# Patient Record
Sex: FEMALE | Race: WHITE | NOT HISPANIC OR LATINO | Employment: FULL TIME | URBAN - METROPOLITAN AREA
[De-identification: names, ages, dates, MRNs, and addresses within clinical notes are randomized per-mention and may not be internally consistent; named-entity substitution may affect disease eponyms.]

---

## 2017-05-11 ENCOUNTER — HOSPITAL ENCOUNTER (OUTPATIENT)
Dept: MAMMOGRAPHY | Facility: CLINIC | Age: 42
Discharge: HOME | End: 2017-05-11
Attending: FAMILY MEDICINE | Admitting: FAMILY MEDICINE
Payer: COMMERCIAL

## 2017-05-11 DIAGNOSIS — Z12.31 VISIT FOR SCREENING MAMMOGRAM: ICD-10-CM

## 2017-05-11 PROCEDURE — G0202 SCR MAMMO BI INCL CAD: HCPCS

## 2017-05-11 PROCEDURE — 77063 BREAST TOMOSYNTHESIS BI: CPT

## 2019-04-08 ENCOUNTER — HOSPITAL ENCOUNTER (OUTPATIENT)
Dept: MAMMOGRAPHY | Facility: CLINIC | Age: 44
Discharge: HOME | End: 2019-04-08
Attending: FAMILY MEDICINE | Admitting: FAMILY MEDICINE
Payer: COMMERCIAL

## 2019-04-08 DIAGNOSIS — Z12.31 VISIT FOR SCREENING MAMMOGRAM: ICD-10-CM

## 2019-04-08 PROCEDURE — 77063 BREAST TOMOSYNTHESIS BI: CPT

## 2021-11-30 DIAGNOSIS — Z52.001 STEM CELL DONOR: Primary | ICD-10-CM

## 2021-12-05 PROCEDURE — 81382 HLA II TYPING 1 LOC HR: CPT | Mod: XU | Performed by: INTERNAL MEDICINE

## 2021-12-05 PROCEDURE — 81378 HLA I & II TYPING HR: CPT

## 2021-12-09 DIAGNOSIS — Z52.001 STEM CELL DONOR: ICD-10-CM

## 2021-12-15 LAB
A*: NORMAL
A*LOCUS SEROLOGIC EQUIVALENT: 3
A*LOCUS: NORMAL
A*SEROLOGIC EQUIVALENT: 26
ABTEST METHOD: NORMAL
B*: NORMAL
B*LOCUS SEROLOGIC EQUIVALENT: 27
B*LOCUS: NORMAL
B*SEROLOGIC EQUIVALENT: 61
BW-1: NORMAL
BW-2: NORMAL
C*LOCUS SEROLOGIC EQUIVALENT: 2
C*LOCUS: NORMAL
DPA1*: NORMAL
DPB1*: NORMAL
DQA1*: NORMAL
DQA1*LOCUS: NORMAL
DQB1*: NORMAL
DQB1*LOCUS SEROLOGIC EQUIVALENT: 7
DQB1*LOCUS: NORMAL
DQB1*SEROLOGIC EQUIVALENT: 5
DRB1*: NORMAL
DRB1*LOCUS SEROLOGIC EQUIVALENT: 1
DRB1*LOCUS: NORMAL
DRB1*SEROLOGIC EQUIVALENT: 11
DRB3*LOCUS SEROLOGIC EQUIVALENT: 52
DRB3*LOCUS: NORMAL
DRSSO TEST METHOD: NORMAL

## 2022-04-18 ENCOUNTER — HOSPITAL ENCOUNTER (OUTPATIENT)
Dept: MAMMOGRAPHY | Facility: CLINIC | Age: 47
Discharge: HOME | End: 2022-04-18
Attending: FAMILY MEDICINE | Admitting: FAMILY MEDICINE
Payer: COMMERCIAL

## 2022-04-18 DIAGNOSIS — Z12.31 VISIT FOR SCREENING MAMMOGRAM: ICD-10-CM

## 2022-04-18 PROCEDURE — 77067 SCR MAMMO BI INCL CAD: CPT

## 2022-04-20 ENCOUNTER — HOSPITAL ENCOUNTER (OUTPATIENT)
Dept: MAMMOGRAPHY | Facility: CLINIC | Age: 47
Discharge: HOME | End: 2022-04-20
Attending: FAMILY MEDICINE | Admitting: FAMILY MEDICINE
Payer: COMMERCIAL

## 2022-04-20 DIAGNOSIS — R92.8 ABNORMAL MAMMOGRAM: ICD-10-CM

## 2022-04-20 PROCEDURE — 76642 ULTRASOUND BREAST LIMITED: CPT | Mod: LT

## 2023-04-19 ENCOUNTER — HOSPITAL ENCOUNTER (OUTPATIENT)
Dept: MAMMOGRAPHY | Facility: CLINIC | Age: 48
Discharge: HOME | End: 2023-04-19
Attending: FAMILY MEDICINE | Admitting: FAMILY MEDICINE
Payer: COMMERCIAL

## 2023-04-19 DIAGNOSIS — Z12.31 VISIT FOR SCREENING MAMMOGRAM: ICD-10-CM

## 2023-04-19 PROCEDURE — 77067 SCR MAMMO BI INCL CAD: CPT

## 2023-09-23 ENCOUNTER — OFFICE VISIT (OUTPATIENT)
Dept: URGENT CARE | Facility: URGENT CARE | Age: 48
End: 2023-09-23
Payer: COMMERCIAL

## 2023-09-23 VITALS
SYSTOLIC BLOOD PRESSURE: 153 MMHG | OXYGEN SATURATION: 100 % | DIASTOLIC BLOOD PRESSURE: 90 MMHG | TEMPERATURE: 97.9 F | RESPIRATION RATE: 20 BRPM | HEART RATE: 65 BPM | WEIGHT: 224 LBS

## 2023-09-23 DIAGNOSIS — S61.225A LACERATION OF LEFT RING FINGER WITH FOREIGN BODY WITHOUT DAMAGE TO NAIL, INITIAL ENCOUNTER: Primary | ICD-10-CM

## 2023-09-23 PROCEDURE — 99207 PR NO CHARGE LOS: CPT | Performed by: PHYSICIAN ASSISTANT

## 2023-09-23 PROCEDURE — 12002 RPR S/N/AX/GEN/TRNK2.6-7.5CM: CPT | Mod: F3 | Performed by: PHYSICIAN ASSISTANT

## 2023-09-23 RX ORDER — ONDANSETRON 4 MG/1
TABLET, ORALLY DISINTEGRATING ORAL
COMMUNITY
Start: 2023-04-11

## 2023-09-23 RX ORDER — VENLAFAXINE HYDROCHLORIDE 150 MG/1
150 CAPSULE, EXTENDED RELEASE ORAL DAILY
COMMUNITY
Start: 2023-06-23

## 2023-09-23 ASSESSMENT — ENCOUNTER SYMPTOMS
NAUSEA: 0
JOINT SWELLING: 0
LIGHT-HEADEDNESS: 0
ARTHRALGIAS: 0
WEAKNESS: 0
DIARRHEA: 0
VOMITING: 0
DIZZINESS: 0
ALLERGIC/IMMUNOLOGIC NEGATIVE: 1
WOUND: 1
SORE THROAT: 0
COUGH: 0
RHINORRHEA: 0
PALPITATIONS: 0
BACK PAIN: 0
SHORTNESS OF BREATH: 0
EYES NEGATIVE: 1
CARDIOVASCULAR NEGATIVE: 1
CHILLS: 0
HEADACHES: 0
NECK PAIN: 0
ENDOCRINE NEGATIVE: 1
NECK STIFFNESS: 0
FEVER: 0
RESPIRATORY NEGATIVE: 1
MYALGIAS: 0
MUSCULOSKELETAL NEGATIVE: 1

## 2023-09-23 NOTE — PROGRESS NOTES
Chief Complaint:     Chief Complaint   Patient presents with    Urgent Care     Urgent care visit for finger lacerations on left hand.    Laceration     Laceration of fingers on left hand. The patient cut her pointer, middle and index fingers with hedge clippers around 12:30m this afternoon while working at home in her yard.         Assessment:     1. Laceration of left ring finger with foreign body without damage to nail, initial encounter         Plan:    Imaging of the injured area for foreign body or fracture was not  Indicated    Wound closed per procedure note below.    Wounds on the middle and pointer finger were more superficial and did not require closure.      Wound was irrigated with water before repair.  Antibiotic ointment and sterile dressing applied in clinic.     Discussed home wound care. Return to  with increased swelling, pain, redness, pus or fevers.  Follow up for Suture removal in 7 days.       Patient was discharged in stable condition.  Patient verbalized understanding and agreed with this plan.      Procedure Note - Wound Repair:  Procedure performed by Lauri Valente.     Anesthesia was obtained with 1% plain lidocaine via digital block.  The wound, located on the L ring finger, measured 4 cm and was ragged edges, contaminated.  The level of complexity was: intermediate (layered closure or heavily contaminated) .  The neurovascular exam was normal.  It was irrigated with tap water and explored.  The wound was closed using 5-0 Ethylon interrupted.       SUBJECTIVE     HPI: Hilary Hay is an 48 year old female that presents to clinic after cutting self on the L hand with a hedge clippers. She has lacerations on the L ring, middle, and pointer fingers.  She denies numbness of the hand. She denies dysfunction of the hand. Patient denies any loss of strength to the hand.  Patient is up to date on tetanus.    Patient is new to [facility].     Review of Systems:  Review of  Systems   Constitutional:  Negative for chills and fever.   HENT:  Negative for congestion, ear pain, rhinorrhea and sore throat.    Eyes: Negative.    Respiratory: Negative.  Negative for cough and shortness of breath.    Cardiovascular: Negative.  Negative for chest pain and palpitations.   Gastrointestinal:  Negative for diarrhea, nausea and vomiting.   Endocrine: Negative.    Genitourinary: Negative.    Musculoskeletal: Negative.  Negative for arthralgias, back pain, joint swelling, myalgias, neck pain and neck stiffness.   Skin:  Positive for wound. Negative for rash.   Allergic/Immunologic: Negative.  Negative for immunocompromised state.   Neurological:  Negative for dizziness, weakness, light-headedness and headaches.         Family History   No family history on file.     Problem history  There is no problem list on file for this patient.       Allergies  Allergies   Allergen Reactions    Sulfa Antibiotics      PN: : Unknown Reaction        Social History  Social History     Socioeconomic History    Marital status:      Spouse name: Not on file    Number of children: Not on file    Years of education: Not on file    Highest education level: Not on file   Occupational History    Not on file   Tobacco Use    Smoking status: Former     Types: Cigarettes     Passive exposure: Never    Smokeless tobacco: Never    Tobacco comments:     Smoked cigarettes for 12 years and quit in 2008.   Substance and Sexual Activity    Alcohol use: Not on file    Drug use: Not on file    Sexual activity: Not on file   Other Topics Concern    Not on file   Social History Narrative    Not on file     Social Determinants of Health     Financial Resource Strain: Not on file   Food Insecurity: Not on file   Transportation Needs: Not on file   Physical Activity: Not on file   Stress: Not on file   Social Connections: Not on file   Interpersonal Safety: Not on file   Housing Stability: Not on file        Current Meds    Current  Outpatient Medications:     Loratadine (ALAVERT PO), , Disp: , Rfl:     PRILOSEC OR, None Entered, Disp: , Rfl:     venlafaxine (EFFEXOR XR) 150 MG 24 hr capsule, Take 150 mg by mouth daily, Disp: , Rfl:     IRON OR, None Entered (Patient not taking: Reported on 9/23/2023), Disp: , Rfl:     ondansetron (ZOFRAN ODT) 4 MG ODT tab, , Disp: , Rfl:     VITAMIN B-12 OR, None Entered (Patient not taking: Reported on 9/23/2023), Disp: , Rfl:      OBJECTIVE    Vitals reviewed by Parviz Valente PA-C  BP (!) 153/90 (BP Location: Right arm, Patient Position: Sitting, Cuff Size: Adult Large)   Pulse 65   Temp 97.9  F (36.6  C) (Oral)   Resp 20   Wt 101.6 kg (224 lb)   LMP 09/18/2023   SpO2 100%   Breastfeeding No     Physical Exam:    Physical Exam  Vitals and nursing note reviewed.   Constitutional:       General: She is not in acute distress.     Appearance: She is well-developed. She is not ill-appearing, toxic-appearing or diaphoretic.   HENT:      Head: Normocephalic and atraumatic.      Right Ear: Tympanic membrane and external ear normal. No drainage, swelling or tenderness. Tympanic membrane is not perforated, erythematous, retracted or bulging.      Left Ear: Tympanic membrane and external ear normal. No drainage, swelling or tenderness. Tympanic membrane is not perforated, erythematous, retracted or bulging.      Nose: No mucosal edema, congestion or rhinorrhea.      Right Sinus: No maxillary sinus tenderness or frontal sinus tenderness.      Left Sinus: No maxillary sinus tenderness or frontal sinus tenderness.      Mouth/Throat:      Pharynx: No pharyngeal swelling, oropharyngeal exudate, posterior oropharyngeal erythema or uvula swelling.      Tonsils: No tonsillar abscesses.   Eyes:      Pupils: Pupils are equal, round, and reactive to light.   Neck:      Trachea: Trachea normal.   Cardiovascular:      Rate and Rhythm: Normal rate and regular rhythm.      Heart sounds: Normal heart sounds, S1 normal and S2  normal. No murmur heard.     No friction rub. No gallop.   Pulmonary:      Effort: Pulmonary effort is normal. No respiratory distress.      Breath sounds: Normal breath sounds. No decreased breath sounds, wheezing, rhonchi or rales.   Abdominal:      General: Bowel sounds are normal. There is no distension.      Palpations: Abdomen is soft. Abdomen is not rigid. There is no mass.      Tenderness: There is no abdominal tenderness. There is no guarding or rebound.   Musculoskeletal:      Cervical back: Full passive range of motion without pain, normal range of motion and neck supple.      Comments: Laceration of the L ring finger subcutaneus in nature with ragged edges and contamination.     Lymphadenopathy:      Cervical: No cervical adenopathy.   Skin:     General: Skin is warm and dry.   Neurological:      Mental Status: She is alert and oriented to person, place, and time.      Cranial Nerves: No cranial nerve deficit.      Deep Tendon Reflexes: Reflexes are normal and symmetric.   Psychiatric:         Behavior: Behavior normal. Behavior is cooperative.         Thought Content: Thought content normal.         Judgment: Judgment normal.         Parviz Valente PA-C 1:58 PM

## 2023-09-30 ENCOUNTER — OFFICE VISIT (OUTPATIENT)
Dept: URGENT CARE | Facility: URGENT CARE | Age: 48
End: 2023-09-30
Payer: COMMERCIAL

## 2023-09-30 VITALS — HEART RATE: 67 BPM | TEMPERATURE: 98.9 F | OXYGEN SATURATION: 97 % | WEIGHT: 224 LBS

## 2023-09-30 DIAGNOSIS — Z48.02 ENCOUNTER FOR REMOVAL OF SUTURES: Primary | ICD-10-CM

## 2023-09-30 PROCEDURE — 99207 PR NO CHARGE LOS: CPT | Performed by: EMERGENCY MEDICINE

## 2023-09-30 NOTE — PROGRESS NOTES
ICD-10-CM    1. Encounter for removal of sutures  Z48.02           Hilary Hay presents to the clinic for removal of sutures. The patient has had sutures in place for 7 days. There has been no patient reported signs or symptoms of infection or drainage. 8 sutures intact and visualized. Tetanus status is up to date. All sutures were easily removed today. Routine wound care discussed by the provider. The patient will follow up as needed.    Jae Bowles PA-C  9/30/2023  [facility]

## 2024-04-16 ENCOUNTER — HOSPITAL ENCOUNTER (EMERGENCY)
Facility: CLINIC | Age: 49
Discharge: HOME | End: 2024-04-17
Attending: EMERGENCY MEDICINE | Admitting: EMERGENCY MEDICINE
Payer: COMMERCIAL

## 2024-04-16 DIAGNOSIS — R07.9 LEFT-SIDED CHEST PAIN: ICD-10-CM

## 2024-04-16 PROCEDURE — 99285 EMERGENCY DEPT VISIT HI MDM: CPT | Mod: 25

## 2024-04-16 PROCEDURE — 93005 ELECTROCARDIOGRAM TRACING: CPT

## 2024-04-17 ENCOUNTER — APPOINTMENT (OUTPATIENT)
Dept: GENERAL RADIOLOGY | Facility: CLINIC | Age: 49
End: 2024-04-17
Attending: EMERGENCY MEDICINE
Payer: COMMERCIAL

## 2024-04-17 VITALS
OXYGEN SATURATION: 98 % | HEART RATE: 90 BPM | SYSTOLIC BLOOD PRESSURE: 148 MMHG | DIASTOLIC BLOOD PRESSURE: 100 MMHG | RESPIRATION RATE: 14 BRPM | WEIGHT: 250 LBS | TEMPERATURE: 99.3 F | HEIGHT: 61 IN | BODY MASS INDEX: 47.2 KG/M2

## 2024-04-17 LAB
ALBUMIN UR-MCNC: NEGATIVE MG/DL
ANION GAP SERPL CALCULATED.3IONS-SCNC: 15 MMOL/L (ref 7–15)
APPEARANCE UR: CLEAR
ATRIAL RATE - MUSE: 94 BPM
BASOPHILS # BLD AUTO: 0 10E3/UL (ref 0–0.2)
BASOPHILS NFR BLD AUTO: 0 %
BILIRUB UR QL STRIP: NEGATIVE
BUN SERPL-MCNC: 13.8 MG/DL (ref 6–20)
CALCIUM SERPL-MCNC: 9.3 MG/DL (ref 8.6–10)
CHLORIDE SERPL-SCNC: 103 MMOL/L (ref 98–107)
COLOR UR AUTO: ABNORMAL
CREAT SERPL-MCNC: 0.66 MG/DL (ref 0.51–0.95)
D DIMER PPP FEU-MCNC: <0.27 UG/ML FEU (ref 0–0.5)
DEPRECATED HCO3 PLAS-SCNC: 21 MMOL/L (ref 22–29)
DIASTOLIC BLOOD PRESSURE - MUSE: NORMAL MMHG
EGFRCR SERPLBLD CKD-EPI 2021: >90 ML/MIN/1.73M2
EOSINOPHIL # BLD AUTO: 0.1 10E3/UL (ref 0–0.7)
EOSINOPHIL NFR BLD AUTO: 0 %
ERYTHROCYTE [DISTWIDTH] IN BLOOD BY AUTOMATED COUNT: 11.9 % (ref 10–15)
GLUCOSE SERPL-MCNC: 115 MG/DL (ref 70–99)
GLUCOSE UR STRIP-MCNC: NEGATIVE MG/DL
HCG INTACT+B SERPL-ACNC: <1 MIU/ML
HCT VFR BLD AUTO: 38.9 % (ref 35–47)
HGB BLD-MCNC: 12.7 G/DL (ref 11.7–15.7)
HGB UR QL STRIP: ABNORMAL
HOLD SPECIMEN: NORMAL
IMM GRANULOCYTES # BLD: 0 10E3/UL
IMM GRANULOCYTES NFR BLD: 0 %
INTERPRETATION ECG - MUSE: NORMAL
KETONES UR STRIP-MCNC: NEGATIVE MG/DL
LEUKOCYTE ESTERASE UR QL STRIP: NEGATIVE
LYMPHOCYTES # BLD AUTO: 2.1 10E3/UL (ref 0.8–5.3)
LYMPHOCYTES NFR BLD AUTO: 15 %
MCH RBC QN AUTO: 29 PG (ref 26.5–33)
MCHC RBC AUTO-ENTMCNC: 32.6 G/DL (ref 31.5–36.5)
MCV RBC AUTO: 89 FL (ref 78–100)
MONOCYTES # BLD AUTO: 1.1 10E3/UL (ref 0–1.3)
MONOCYTES NFR BLD AUTO: 8 %
MUCOUS THREADS #/AREA URNS LPF: PRESENT /LPF
NEUTROPHILS # BLD AUTO: 11.1 10E3/UL (ref 1.6–8.3)
NEUTROPHILS NFR BLD AUTO: 77 %
NITRATE UR QL: NEGATIVE
NRBC # BLD AUTO: 0 10E3/UL
NRBC BLD AUTO-RTO: 0 /100
P AXIS - MUSE: -21 DEGREES
PH UR STRIP: 6.5 [PH] (ref 5–7)
PLATELET # BLD AUTO: 406 10E3/UL (ref 150–450)
POTASSIUM SERPL-SCNC: 4.2 MMOL/L (ref 3.4–5.3)
PR INTERVAL - MUSE: 152 MS
QRS DURATION - MUSE: 86 MS
QT - MUSE: 336 MS
QTC - MUSE: 420 MS
R AXIS - MUSE: 31 DEGREES
RBC # BLD AUTO: 4.38 10E6/UL (ref 3.8–5.2)
RBC URINE: 6 /HPF
SODIUM SERPL-SCNC: 139 MMOL/L (ref 135–145)
SP GR UR STRIP: 1.02 (ref 1–1.03)
SQUAMOUS EPITHELIAL: 1 /HPF
SYSTOLIC BLOOD PRESSURE - MUSE: NORMAL MMHG
T AXIS - MUSE: 36 DEGREES
TROPONIN T SERPL HS-MCNC: <6 NG/L
UROBILINOGEN UR STRIP-MCNC: NORMAL MG/DL
VENTRICULAR RATE- MUSE: 94 BPM
WBC # BLD AUTO: 14.4 10E3/UL (ref 4–11)
WBC URINE: <1 /HPF

## 2024-04-17 PROCEDURE — 81001 URINALYSIS AUTO W/SCOPE: CPT | Performed by: EMERGENCY MEDICINE

## 2024-04-17 PROCEDURE — 84702 CHORIONIC GONADOTROPIN TEST: CPT | Performed by: EMERGENCY MEDICINE

## 2024-04-17 PROCEDURE — 84484 ASSAY OF TROPONIN QUANT: CPT | Performed by: EMERGENCY MEDICINE

## 2024-04-17 PROCEDURE — 80048 BASIC METABOLIC PNL TOTAL CA: CPT | Performed by: EMERGENCY MEDICINE

## 2024-04-17 PROCEDURE — 85379 FIBRIN DEGRADATION QUANT: CPT | Performed by: EMERGENCY MEDICINE

## 2024-04-17 PROCEDURE — 36415 COLL VENOUS BLD VENIPUNCTURE: CPT | Performed by: EMERGENCY MEDICINE

## 2024-04-17 PROCEDURE — 85025 COMPLETE CBC W/AUTO DIFF WBC: CPT | Performed by: EMERGENCY MEDICINE

## 2024-04-17 PROCEDURE — 71046 X-RAY EXAM CHEST 2 VIEWS: CPT

## 2024-04-17 ASSESSMENT — ACTIVITIES OF DAILY LIVING (ADL)
ADLS_ACUITY_SCORE: 35

## 2024-04-17 ASSESSMENT — COLUMBIA-SUICIDE SEVERITY RATING SCALE - C-SSRS
2. HAVE YOU ACTUALLY HAD ANY THOUGHTS OF KILLING YOURSELF IN THE PAST MONTH?: NO
6. HAVE YOU EVER DONE ANYTHING, STARTED TO DO ANYTHING, OR PREPARED TO DO ANYTHING TO END YOUR LIFE?: NO
1. IN THE PAST MONTH, HAVE YOU WISHED YOU WERE DEAD OR WISHED YOU COULD GO TO SLEEP AND NOT WAKE UP?: NO

## 2024-04-17 NOTE — ED TRIAGE NOTES
Pt c/o L sided rib pain and L shoulder pain that is sharp. Pain began last night. Pt reports it is painful to take a breathe in and worsening pain when she tried to lay down. Pt traveled by car for 24 hours on Friday and then took a plane ride home. Pt denies trauma.      Triage Assessment (Adult)       Row Name 04/17/24 0001          Triage Assessment    Airway WDL WDL        Respiratory WDL    Respiratory WDL WDL        Cardiac WDL    Cardiac WDL --  Pt hypertensive        Cognitive/Neuro/Behavioral WDL    Cognitive/Neuro/Behavioral WDL WDL

## 2024-04-17 NOTE — ED PROVIDER NOTES
"  History     Chief Complaint:  Rib Pain and Flank Pain     HPI   Hilary Hay is a 48 year old female who comes for left sided rib and left sided shoulder pain that is sharp.  She reports that he had symptoms on the previous day and thought that she had gas pains.  They resolved.  She felt well all day but then tonight when she went to lay down she developed recurrent pain on the left side that was quite bothersome.  She became concerned because they did drive 26 hours on Friday to [place] and then took a plane ride home.  She does not remember any trauma but she does note that she sat in the plane scrunched up.  No rashes.  Her  was concerned because she seemed to be very warm but she did not have a fever when she got here.  She noticed chattering chills.  She has not had any coughing.  No history of kidney stones.  It does hurt to take a deep breath.  She has an IUD but is not on any hormonal medications. No history of cancer, DVT/PE, hemoptysis, smoking, hormone use, lower extremity symptoms.    Independent Historian:    None - Patient Only    Review of External Notes:  None    Allergies:  Sulfa Antibiotics     Medications:    IRON OR  Loratadine (ALAVERT PO)  ondansetron (ZOFRAN ODT) 4 MG ODT tab  PRILOSEC OR  venlafaxine (EFFEXOR XR) 150 MG 24 hr capsule  VITAMIN B-12 OR        Past Medical History:    No past medical history on file.    Past Surgical History:    No past surgical history on file.     Physical Exam   Patient Vitals for the past 24 hrs:   BP Temp Temp src Pulse Resp SpO2 Height Weight   04/17/24 0321 -- -- -- -- -- 98 % -- --   04/17/24 0320 (!) 148/100 -- -- 90 -- -- -- --   04/17/24 0136 (!) 144/86 99.3  F (37.4  C) Oral 89 14 98 % -- --   04/17/24 0135 -- -- -- -- 14 98 % -- --   04/17/24 0134 (!) 144/86 -- -- 89 -- -- -- --   04/16/24 2358 (!) 180/102 -- -- -- -- -- -- --   04/16/24 2357 -- 98.6  F (37  C) Temporal 84 20 100 % 1.549 m (5' 1\") 113.4 kg (250 lb)    "     Physical Exam  General: Well-nourished  Eyes: PERRL, conjunctivae pink no scleral icterus or conjunctival injection  ENT:  Moist mucus membranes, posterior oropharynx clear without erythema or exudates  Respiratory:  Lungs clear to auscultation bilaterally, no crackles/rubs/wheezes.  Good air movement  CV: Normal rate and rhythm, no murmurs/rubs/gallops  GI:  Abdomen soft and protuberant.  Normoactive BS.  No tenderness, guarding or rebound  Skin: Warm, dry.  No rashes or petechiae.  No zoster rash.  No cellulitis.  Musculoskeletal: No midline spinal tenderness.  No peripheral edema or calf tenderness  Neuro: Alert and oriented to person/place/time  Psychiatric: Normal affect    Emergency Department Course   ECG  ECG results from 04/16/24   EKG 12 lead     Value    Systolic Blood Pressure     Diastolic Blood Pressure     Ventricular Rate 94    Atrial Rate 94    NC Interval 152    QRS Duration 86        QTc 420    P Axis -21    R AXIS 31    T Axis 36    Interpretation ECG      Sinus rhythm  Normal ECG  No previous ECGs available  Confirmed by GENERATED REPORT, COMPUTER ([id number]),  Aasen, Bradley ([id number]) on 4/17/2024 1:18:48 AM           Laboratory: Imaging:   Labs Ordered and Resulted from Time of ED Arrival to Time of ED Departure   BASIC METABOLIC PANEL - Abnormal       Result Value    Sodium 139      Potassium 4.2      Chloride 103      Carbon Dioxide (CO2) 21 (*)     Anion Gap 15      Urea Nitrogen 13.8      Creatinine 0.66      GFR Estimate >90      Calcium 9.3      Glucose 115 (*)    CBC WITH PLATELETS AND DIFFERENTIAL - Abnormal    WBC Count 14.4 (*)     RBC Count 4.38      Hemoglobin 12.7      Hematocrit 38.9      MCV 89      MCH 29.0      MCHC 32.6      RDW 11.9      Platelet Count 406      % Neutrophils 77      % Lymphocytes 15      % Monocytes 8      % Eosinophils 0      % Basophils 0      % Immature Granulocytes 0      NRBCs per 100 WBC 0      Absolute Neutrophils 11.1 (*)     Absolute  Lymphocytes 2.1      Absolute Monocytes 1.1      Absolute Eosinophils 0.1      Absolute Basophils 0.0      Absolute Immature Granulocytes 0.0      Absolute NRBCs 0.0     ROUTINE UA WITH MICROSCOPIC REFLEX TO CULTURE - Abnormal    Color Urine Light Yellow      Appearance Urine Clear      Glucose Urine Negative      Bilirubin Urine Negative      Ketones Urine Negative      Specific Gravity Urine 1.024      Blood Urine Small (*)     pH Urine 6.5      Protein Albumin Urine Negative      Urobilinogen Urine Normal      Nitrite Urine Negative      Leukocyte Esterase Urine Negative      Mucus Urine Present (*)     RBC Urine 6 (*)     WBC Urine <1      Squamous Epithelials Urine 1     TROPONIN T, HIGH SENSITIVITY - Normal    Troponin T, High Sensitivity <6     HCG QUANTITATIVE PREGNANCY - Normal    hCG Quantitative <1     D DIMER QUANTITATIVE - Normal    D-Dimer Quantitative <0.27       Chest XR,  PA & LAT   Final Result   IMPRESSION: Lungs are clear. No pleural effusion or pneumothorax. Normal heart size and pulmonary vascularity.              Emergency Department Course & Assessments:    Interventions:  Medications - No data to display     Independent Interpretation  I reviewed and interpreted the chest x-ray.  I see no evidence of pneumothorax or pneumonia.    Social Determinants of Health affecting care:  None    Disposition:  The patient was discharged to home.     Impression & Plan      Medical Decision Making:  Hilary Hay is a 48 year old female who presents with left sided chest pain and left shoulder pain.  The work up in the Emergency Department is negative.  The differential diagnosis of chest pain is broad and includes life threatening etiologies such as acute coronary syndrome, myocardial infarction, pulmonary embolism, acute aortic dissection, amongst others.  The patient's EKG was nonischemic and troponin was normal.  The chest pain symptom complex would be atypical for coronary ischemia given  that it is not exertional.  The patient is low risk for PE and has a negative D-dimer and so I feel the risks of CT imaging outweighs any benefits.  Chest xray reveals no evidence of pneumonia or pneumothorax.  No serious etiology for the chest pain were detected today during this visit.  Close follow up with primary care is indicated should the pain continue, as further work up may be performed; this was made clear to the patient, who understands.     Diagnosis:    ICD-10-CM    1. Left-sided chest pain  R07.9            Discharge Medications:  Discharge Medication List as of 4/17/2024  3:16 AM             4/17/2024   Yara Nielson MD Cho, Amy C, MD  04/17/24 0519

## 2024-04-17 NOTE — DISCHARGE INSTRUCTIONS
*You may resume diet and activities as tolerated.  *Ibuprofen and/or tylenol as directed as needed for pain.  *Follow-up with your doctor for a recheck in 2-3 days.    *Return if you develop difficulty in breathing, faint or feel like you will faint or become worse in any way.    Discharge Instructions  Chest Pain    You have been seen today for chest pain or discomfort.  At this time, your provider has found no signs that your chest pain is due to a serious or life-threatening condition, (or you have declined more testing and/or admission to the hospital). However, sometimes there is a serious problem that does not show up right away. Your evaluation today may not be complete and you may need further testing and evaluation.     Generally, every Emergency Department visit should have a follow-up clinic visit with either a primary or a specialty clinic/provider. Please follow-up as instructed by your emergency provider today.  Return to the Emergency Department if:  Your chest pain changes, gets worse, starts to happen more often, or comes with less activity.  You are newly short of breath.  You get very weak or tired.  You pass out or faint.  You have any new symptoms, like fever, cough, numb legs, or you cough up blood.  You have anything else that worries you.    Until you follow-up with your regular provider, please do the following:  Take one aspirin daily unless you have an allergy or are told not to by your provider.  If a stress test appointment has been made, go to the appointment.  If you have questions, contact your regular provider.  Follow-up with your regular provider/clinic as directed; this is very important.    If you were given a prescription for medicine here today, be sure to read all of the information (including the package insert) that comes with your prescription.  This will include important information about the medicine, its side effects, and any warnings that you need to know about.  The  pharmacist who fills the prescription can provide more information and answer questions you may have about the medicine.  If you have questions or concerns that the pharmacist cannot address, please call or return to the Emergency Department.       Remember that you can always come back to the Emergency Department if you are not able to see your regular provider in the amount of time listed above, if you get any new symptoms, or if there is anything that worries you.

## 2024-12-28 ENCOUNTER — OFFICE VISIT (OUTPATIENT)
Dept: URGENT CARE | Facility: URGENT CARE | Age: 49
End: 2024-12-28
Payer: COMMERCIAL

## 2024-12-28 VITALS
DIASTOLIC BLOOD PRESSURE: 94 MMHG | OXYGEN SATURATION: 97 % | RESPIRATION RATE: 16 BRPM | WEIGHT: 255.5 LBS | SYSTOLIC BLOOD PRESSURE: 169 MMHG | HEART RATE: 87 BPM | BODY MASS INDEX: 48.28 KG/M2 | TEMPERATURE: 98.3 F

## 2024-12-28 DIAGNOSIS — R06.02 SOB (SHORTNESS OF BREATH): ICD-10-CM

## 2024-12-28 DIAGNOSIS — J06.9 VIRAL UPPER RESPIRATORY TRACT INFECTION WITH COUGH: Primary | ICD-10-CM

## 2024-12-28 DIAGNOSIS — R05.1 ACUTE COUGH: ICD-10-CM

## 2024-12-28 LAB
FLUAV AG SPEC QL IA: NEGATIVE
FLUBV AG SPEC QL IA: NEGATIVE

## 2024-12-28 PROCEDURE — 87635 SARS-COV-2 COVID-19 AMP PRB: CPT | Performed by: PHYSICIAN ASSISTANT

## 2024-12-28 PROCEDURE — 87804 INFLUENZA ASSAY W/OPTIC: CPT | Performed by: PHYSICIAN ASSISTANT

## 2024-12-28 PROCEDURE — 99214 OFFICE O/P EST MOD 30 MIN: CPT | Performed by: PHYSICIAN ASSISTANT

## 2024-12-28 RX ORDER — PREDNISONE 20 MG/1
40 TABLET ORAL DAILY
Qty: 10 TABLET | Refills: 0 | Status: SHIPPED | OUTPATIENT
Start: 2024-12-28 | End: 2025-01-02

## 2024-12-28 RX ORDER — ALBUTEROL SULFATE 90 UG/1
2 INHALANT RESPIRATORY (INHALATION) EVERY 6 HOURS PRN
Qty: 18 G | Refills: 0 | Status: SHIPPED | OUTPATIENT
Start: 2024-12-28

## 2024-12-28 RX ORDER — IRBESARTAN 150 MG/1
TABLET ORAL
COMMUNITY
Start: 2024-04-04

## 2024-12-28 RX ORDER — LOSARTAN POTASSIUM 50 MG/1
1 TABLET ORAL
COMMUNITY
Start: 2024-12-22

## 2024-12-28 RX ORDER — ALBUTEROL SULFATE 90 UG/1
2 INHALANT RESPIRATORY (INHALATION) EVERY 6 HOURS PRN
COMMUNITY

## 2024-12-28 ASSESSMENT — ENCOUNTER SYMPTOMS
NAUSEA: 0
NECK STIFFNESS: 0
DIARRHEA: 0
BACK PAIN: 0
LIGHT-HEADEDNESS: 0
NECK PAIN: 0
CARDIOVASCULAR NEGATIVE: 1
WEAKNESS: 0
EYES NEGATIVE: 1
SHORTNESS OF BREATH: 1
MYALGIAS: 0
CHILLS: 0
ALLERGIC/IMMUNOLOGIC NEGATIVE: 1
HEADACHES: 1
WOUND: 0
MUSCULOSKELETAL NEGATIVE: 1
JOINT SWELLING: 0
ENDOCRINE NEGATIVE: 1
ARTHRALGIAS: 0
DIZZINESS: 0
VOMITING: 0
COUGH: 1
FEVER: 1
RHINORRHEA: 0
PALPITATIONS: 0
SORE THROAT: 0

## 2024-12-28 NOTE — PROGRESS NOTES
Chief Complaint:     Chief Complaint   Patient presents with    Urgent Care     Urgent care visit for cough, fever.    Cough     Cough for three days. No at-home COVID testing was done.    Fever     Fever Thursday and Friday, but not yet today. She did not take her temperature but she had chills and sweats those two days.    URI     Runny/stuffy nose. There was a lot of mold that they found three weeks ago in their home due to a leak in the basement. They cleaned it all out, ripped up the carpet and sprayed, but they are not sure how long it had been like that. No sore throat. Chest and lungs hurt.    Headache     Constant headache.    Generalized Body Aches    Shortness of Breath     The patient states she has had SOB since the mold was found.        Results for orders placed or performed in visit on 12/28/24   Influenza A & B Antigen - Clinic Collect     Status: Normal    Specimen: Nose; Swab   Result Value Ref Range    Influenza A antigen Negative Negative    Influenza B antigen Negative Negative    Narrative    Test results must be correlated with clinical data. If necessary, results should be confirmed by a molecular assay or viral culture.       Medical Decision Making:    Vital signs reviewed by Parviz Valente PA-C  BP (!) 169/94 (BP Location: Left arm, Patient Position: Sitting, Cuff Size: Adult Large)   Pulse 87   Temp 98.3  F (36.8  C) (Oral)   Resp 16   Wt 115.9 kg (255 lb 8 oz)   LMP 12/21/2024   SpO2 97%   Breastfeeding No   BMI 48.28 kg/m      Differential Diagnosis:  URI Adult/Peds:  Bronchitis-viral, Influenza, Pneumonia, and Viral upper respiratory illness        ASSESSMENT    1. Viral upper respiratory tract infection with cough    2. SOB (shortness of breath)    3. Acute cough        PLAN    Patient is in no acute distress.    Temp is 98.3 in clinic today, lung sounds were clear, and O2 sats at 97% on RA.    Influenza was negative.  COVID swab collected in clinic today.  Rx for  Prednisone and Albuterol inhaler for SOB.  Rest, Push fluids, vaporizer, elevation of head of bed.  Ibuprofen and or Tylenol for any fever or body aches.  Over the counter cough suppressant- PRN- as discussed.   If symptoms worsen, recheck immediately otherwise follow up with your PCP in 1 week if symptoms are not improving.  Worrisome symptoms discussed with instructions to go to the ED.  Patient verbalized understanding and agreed with this plan.    34 minutes was spent by me on the date of the encounter doing chart review, history and exam, documentation and further activities per the note.      Labs:    Results for orders placed or performed in visit on 12/28/24   Influenza A & B Antigen - Clinic Collect     Status: Normal    Specimen: Nose; Swab   Result Value Ref Range    Influenza A antigen Negative Negative    Influenza B antigen Negative Negative    Narrative    Test results must be correlated with clinical data. If necessary, results should be confirmed by a molecular assay or viral culture.        Vital signs reviewed by Parviz Valente PA-C  BP (!) 169/94 (BP Location: Left arm, Patient Position: Sitting, Cuff Size: Adult Large)   Pulse 87   Temp 98.3  F (36.8  C) (Oral)   Resp 16   Wt 115.9 kg (255 lb 8 oz)   LMP 12/21/2024   SpO2 97%   Breastfeeding No   BMI 48.28 kg/m      Current Meds      Current Outpatient Medications:     albuterol (PROAIR HFA/PROVENTIL HFA/VENTOLIN HFA) 108 (90 Base) MCG/ACT inhaler, Inhale 2 puffs into the lungs every 6 hours as needed for shortness of breath, wheezing or cough., Disp: , Rfl:     albuterol (PROAIR HFA/PROVENTIL HFA/VENTOLIN HFA) 108 (90 Base) MCG/ACT inhaler, Inhale 2 puffs into the lungs every 6 hours as needed for shortness of breath, wheezing or cough., Disp: 18 g, Rfl: 0    Loratadine (ALAVERT PO), , Disp: , Rfl:     losartan (COZAAR) 50 MG tablet, Take 1 tablet by mouth daily at 2 pm., Disp: , Rfl:     predniSONE (DELTASONE) 20 MG tablet, Take 2  tablets (40 mg) by mouth daily for 5 days., Disp: 10 tablet, Rfl: 0    PRILOSEC OR, None Entered, Disp: , Rfl:     venlafaxine (EFFEXOR XR) 150 MG 24 hr capsule, Take 150 mg by mouth daily, Disp: , Rfl:     irbesartan (AVAPRO) 150 MG tablet, , Disp: , Rfl:     IRON OR, None Entered (Patient not taking: Reported on 12/28/2024), Disp: , Rfl:     ondansetron (ZOFRAN ODT) 4 MG ODT tab, , Disp: , Rfl:     VITAMIN B-12 OR, None Entered (Patient not taking: Reported on 12/28/2024), Disp: , Rfl:       Respiratory History    occasional episodes of bronchitis      SUBJECTIVE    HPI: Hilary Hay is an 49 year old female who presents with aching, chest congestion, cough nonproductive, occasional, fever, SOB with activity and headache.  Symptoms began 3  days ago and has unchanged.  There is no shortness of breath, wheezing, and chest pain.  Patient is eating and drinking well.  No nausea, vomiting, or diarrhea.    Patient denies any recent travel or exposure to known COVID positive tested individual.      ROS:     Review of Systems   Constitutional:  Positive for fever. Negative for chills.   HENT:  Positive for congestion. Negative for ear pain, rhinorrhea and sore throat.    Eyes: Negative.    Respiratory:  Positive for cough and shortness of breath.    Cardiovascular: Negative.  Negative for chest pain and palpitations.   Gastrointestinal:  Negative for diarrhea, nausea and vomiting.   Endocrine: Negative.    Genitourinary: Negative.    Musculoskeletal: Negative.  Negative for arthralgias, back pain, joint swelling, myalgias, neck pain and neck stiffness.   Skin: Negative.  Negative for rash and wound.   Allergic/Immunologic: Negative.  Negative for immunocompromised state.   Neurological:  Positive for headaches. Negative for dizziness, weakness and light-headedness.         Family History   No family history on file.     Problem history  There is no problem list on file for this patient.        Allergies  Allergies   Allergen Reactions    Sulfa Antibiotics      PN: : Unknown Reaction        Social History  Social History     Socioeconomic History    Marital status:      Spouse name: Not on file    Number of children: Not on file    Years of education: Not on file    Highest education level: Not on file   Occupational History    Not on file   Tobacco Use    Smoking status: Former     Types: Cigarettes     Passive exposure: Never    Smokeless tobacco: Never    Tobacco comments:     Smoked cigarettes for 12 years and quit in 2008.   Substance and Sexual Activity    Alcohol use: Not on file    Drug use: Not on file    Sexual activity: Not on file   Other Topics Concern    Not on file   Social History Narrative    Not on file     Social Drivers of Health     Financial Resource Strain: Not on file   Food Insecurity: Not on file   Transportation Needs: Not on file   Physical Activity: Not on file   Stress: Not on file   Social Connections: Not on file   Interpersonal Safety: Not on file   Housing Stability: Not on file        OBJECTIVE     Vital signs reviewed by Parviz Valente PA-C  BP (!) 169/94 (BP Location: Left arm, Patient Position: Sitting, Cuff Size: Adult Large)   Pulse 87   Temp 98.3  F (36.8  C) (Oral)   Resp 16   Wt 115.9 kg (255 lb 8 oz)   LMP 12/21/2024   SpO2 97%   Breastfeeding No   BMI 48.28 kg/m       Physical Exam  Vitals and nursing note reviewed.   Constitutional:       General: She is not in acute distress.     Appearance: She is well-developed. She is not ill-appearing, toxic-appearing or diaphoretic.   HENT:      Head: Normocephalic and atraumatic.      Right Ear: Hearing, tympanic membrane, ear canal and external ear normal. Tympanic membrane is not perforated, erythematous, retracted or bulging.      Left Ear: Hearing, tympanic membrane, ear canal and external ear normal. Tympanic membrane is not perforated, erythematous, retracted or bulging.      Nose: Congestion  present. No mucosal edema or rhinorrhea.      Right Sinus: No maxillary sinus tenderness or frontal sinus tenderness.      Left Sinus: No maxillary sinus tenderness or frontal sinus tenderness.      Mouth/Throat:      Pharynx: No pharyngeal swelling, oropharyngeal exudate, posterior oropharyngeal erythema or uvula swelling.      Tonsils: No tonsillar exudate or tonsillar abscesses. 0 on the right. 0 on the left.   Eyes:      General:         Right eye: No discharge.         Left eye: No discharge.      Pupils: Pupils are equal, round, and reactive to light.   Cardiovascular:      Rate and Rhythm: Normal rate and regular rhythm.      Heart sounds: Normal heart sounds. No murmur heard.     No friction rub. No gallop.   Pulmonary:      Effort: Pulmonary effort is normal. No respiratory distress.      Breath sounds: Normal breath sounds. No decreased breath sounds, wheezing, rhonchi or rales.   Chest:      Chest wall: No tenderness.   Abdominal:      General: Bowel sounds are normal. There is no distension.      Palpations: Abdomen is soft. There is no mass.      Tenderness: There is no abdominal tenderness. There is no guarding.   Musculoskeletal:      Cervical back: Normal range of motion and neck supple.   Lymphadenopathy:      Head:      Right side of head: No submental, submandibular, tonsillar, preauricular or posterior auricular adenopathy.      Left side of head: No submental, submandibular, tonsillar, preauricular or posterior auricular adenopathy.      Cervical: No cervical adenopathy.      Right cervical: No superficial or posterior cervical adenopathy.     Left cervical: No superficial or posterior cervical adenopathy.   Skin:     General: Skin is warm and dry.      Findings: No rash.   Neurological:      Mental Status: She is alert and oriented to person, place, and time.      Cranial Nerves: No cranial nerve deficit.      Deep Tendon Reflexes: Reflexes are normal and symmetric.   Psychiatric:          Behavior: Behavior normal. Behavior is cooperative.         Thought Content: Thought content normal.         Judgment: Judgment normal.           Parviz Valente PA-C  12/28/2024, 12:23 PM

## 2024-12-29 LAB — SARS-COV-2 RNA RESP QL NAA+PROBE: NEGATIVE

## 2025-04-24 ENCOUNTER — HOSPITAL ENCOUNTER (OUTPATIENT)
Dept: MAMMOGRAPHY | Facility: CLINIC | Age: 50
Discharge: HOME | End: 2025-04-24
Attending: FAMILY MEDICINE
Payer: COMMERCIAL

## 2025-04-24 DIAGNOSIS — Z12.31 VISIT FOR SCREENING MAMMOGRAM: ICD-10-CM

## 2025-04-24 PROCEDURE — 77063 BREAST TOMOSYNTHESIS BI: CPT

## 2025-05-04 ENCOUNTER — HEALTH MAINTENANCE LETTER (OUTPATIENT)
Age: 50
End: 2025-05-04